# Patient Record
Sex: MALE | Race: WHITE | NOT HISPANIC OR LATINO | Employment: OTHER | URBAN - METROPOLITAN AREA
[De-identification: names, ages, dates, MRNs, and addresses within clinical notes are randomized per-mention and may not be internally consistent; named-entity substitution may affect disease eponyms.]

---

## 2022-06-02 PROBLEM — R80.1 PERSISTENT PROTEINURIA: Status: ACTIVE | Noted: 2022-06-02

## 2022-06-02 PROBLEM — I10 ESSENTIAL HYPERTENSION: Status: ACTIVE | Noted: 2022-06-02

## 2022-06-02 PROBLEM — I48.0 PAROXYSMAL ATRIAL FIBRILLATION: Status: ACTIVE | Noted: 2022-06-02

## 2022-06-02 PROBLEM — N18.31 STAGE 3A CHRONIC KIDNEY DISEASE: Status: ACTIVE | Noted: 2022-06-02

## 2022-06-02 PROBLEM — N25.81 SECONDARY HYPERPARATHYROIDISM OF RENAL ORIGIN: Status: ACTIVE | Noted: 2022-06-02

## 2022-06-02 RX ORDER — CARVEDILOL 25 MG/1
25 TABLET ORAL 2 TIMES DAILY WITH MEALS
COMMUNITY

## 2022-06-02 RX ORDER — MEMANTINE HYDROCHLORIDE 10 MG/1
5 TABLET ORAL 2 TIMES DAILY
COMMUNITY

## 2022-06-02 RX ORDER — WARFARIN SODIUM 5 MG/1
5 TABLET ORAL DAILY
COMMUNITY

## 2022-06-02 RX ORDER — DONEPEZIL HYDROCHLORIDE 10 MG/1
10 TABLET, FILM COATED ORAL NIGHTLY
COMMUNITY

## 2022-06-02 RX ORDER — PANTOPRAZOLE SODIUM 40 MG/1
40 TABLET, DELAYED RELEASE ORAL DAILY
COMMUNITY

## 2022-06-02 RX ORDER — LEVOTHYROXINE SODIUM 50 UG/1
50 TABLET ORAL
COMMUNITY

## 2022-06-02 NOTE — PROGRESS NOTES
Pt Name:  Charlie Snider  Pt :  1940  Pt MRN:  [id number]    Date: 6/3/2022    Reason for visit:   Charlie Snider is a 81 y.o. c male presenting for CKD follow up with serum creatinine 1.40, eGFR 46 and Chronic Kidney Disease Stage 3a.     Chief Complaint:   The patient denies any complaints today.    HPI:  The patient is being seen today for follow up CKD and the status of his kidney function. Since the last visit, patient reports no health changes.  The patient denies fatigue, weakness, poor appetite, metallic taste, nausea, cramping, chest pain, palpitations, orthopnea, PND, edema, trouble concentrating, decreased urination.  He c/o SOB with exertion.     He does not routinely monitor his home Bps.  The patient is following a low sodium diet. The patient is avoiding NSAIDs. The patient is drinking 32 - 48 oz of water daily.     Since last visit on 12/3/21 patient reports no changes in medical history.      History:   Past Medical History:   Diagnosis Date    Anemia, unspecified     Arrhythmia     Disorder of kidney and ureter     Diverticulosis     Gastric polyp     Gastritis     HTN (hypertension)     Hypothyroidism, unspecified     Internal hemorrhoids     Irritable bowel syndrome without diarrhea     Paroxysmal atrial fibrillation     Unspecified dementia without behavioral disturbance      Past Surgical History:   Procedure Laterality Date    AICD insertion      COLONOSCOPY      FINGER SURGERY      UPPER GASTROINTESTINAL ENDOSCOPY       Family History   Problem Relation Age of Onset    Diabetes Mother     Hypertension Mother     Heart disease Mother     Cancer Sister     Heart disease Brother      Social History     Substance and Sexual Activity   Alcohol Use Never     Social History     Substance and Sexual Activity   Drug Use Never     Social History     Substance and Sexual Activity   Sexual Activity Not on file     has no history on file for sexual  "activity.  Social History     Tobacco Use   Smoking Status Former Smoker   Smokeless Tobacco Former User       Allergies:  Review of patient's allergies indicates:   Allergen Reactions    Diovan [valsartan]          Current Outpatient Medications:     carvediloL (COREG) 25 MG tablet, Take 25 mg by mouth 2 (two) times daily with meals., Disp: , Rfl:     donepeziL (ARICEPT) 10 MG tablet, Take 10 mg by mouth every evening., Disp: , Rfl:     levothyroxine (SYNTHROID) 50 MCG tablet, Take 50 mcg by mouth before breakfast., Disp: , Rfl:     lisinopriL 10 MG tablet, Take 20 mg by mouth once daily at 6am., Disp: , Rfl:     memantine (NAMENDA) 10 MG Tab, Take 5 mg by mouth 2 (two) times daily. Pt takes 5 mg in am, and 10 mg at pm, then next week will go to 10 mg BID, Disp: , Rfl:     pantoprazole (PROTONIX) 40 MG tablet, Take 40 mg by mouth once daily., Disp: , Rfl:     warfarin (COUMADIN) 5 MG tablet, Take 5 mg by mouth once daily., Disp: , Rfl:     ROS:  Review of Systems   Constitutional: Negative for activity change, appetite change and fatigue.   Eyes: Negative for visual disturbance.   Respiratory: Positive for shortness of breath.    Cardiovascular: Negative for chest pain, palpitations and leg swelling.   Gastrointestinal: Negative for abdominal pain, constipation, nausea and vomiting.   Genitourinary: Negative for decreased urine volume, difficulty urinating, dysuria, flank pain, frequency and urgency.   Musculoskeletal: Negative for back pain and joint swelling.   Skin: Negative for color change and rash.   Neurological: Negative for dizziness, weakness, light-headedness and headaches.       Physical Exam:   Vitals:   Vitals:    06/03/22 0955   BP: 121/80   Pulse: 76   Temp: 96.1 °F (35.6 °C)   SpO2: 99%   Weight: 73 kg (161 lb)   Height: 5' 10" (1.778 m)   PainSc: 0-No pain     Body mass index is 23.1 kg/m².    Physical Exam  Vitals reviewed.   Constitutional:       General: He is awake. He is not in acute " distress.  HENT:      Head: Normocephalic.      Mouth/Throat:      Mouth: Mucous membranes are moist.   Eyes:      General: No scleral icterus.     Conjunctiva/sclera: Conjunctivae normal.      Pupils: Pupils are equal, round, and reactive to light.   Cardiovascular:      Rate and Rhythm: Normal rate and regular rhythm.      Heart sounds: Normal heart sounds, S1 normal and S2 normal. No murmur heard.     Comments: Bilateral pedal edema trace   Pulmonary:      Effort: Pulmonary effort is normal.      Breath sounds: Normal breath sounds. No wheezing, rhonchi or rales.   Abdominal:      General: There is no distension.      Palpations: Abdomen is soft. There is no mass.   Musculoskeletal:         General: No swelling.      Right lower leg: Edema present.      Left lower leg: Edema present.   Skin:     General: Skin is warm and dry.      Nails: There is no clubbing.   Neurological:      General: No focal deficit present.      Mental Status: He is alert and oriented to person, place, and time.   Psychiatric:         Mood and Affect: Mood and affect normal.         Thought Content: Thought content normal.         Labs/Tests:  Component Ref Range & Units 2 d ago   (5/31/22) 6 mo ago   (12/2/21) 6 mo ago   (12/2/21) 1 yr ago   (5/25/21) 1 yr ago   (5/25/21) 1 yr ago   (11/23/20) 1 yr ago   (11/23/20)   Sodium 136 - 147 mmol/L 140  139    140  139     Potassium 3.5 - 5.1 mmol/L 4.0  4.2    4.5  4.4     Chloride 98 - 107 mmol/L 107  107    107  105     CO2 20 - 31 mmol/L 29  29    27  29     Glucose 70 - 99 mg/dL 90  125 High   Negative R  Negative R  92  88  Negative R    BUN 9 - 23 mg/dL 24 High   26 High     18  31 High      Creatinine 0.70 - 1.30 mg/dL 1.40 High   1.46 High     1.33 High   1.61 High      Calcium 8.3 - 10.6 mg/dL 8.9  9.2    9.4  9.4     Phosphorus Level 2.4 - 5.1 mg/dL 3.4  3.0    3.1  3.4     Albumin Level 3.2 - 4.8 g/dL 4.1  4.1    4.4  4.3     eGFR >90 mL/min/1.73m2 46 Low   44 Low     50 Low   39 Low       eGFR African American >90 mL/min/1.73m2 53 Low   51 Low  CM           Component Ref Range & Units 2 d ago   (5/31/22) 6 mo ago   (12/2/21) 1 yr ago   (5/25/21) 1 yr ago   (11/23/20) 2 yr ago   (5/20/20)   Protein, Urine mg/dL mg/dL 24  32  16  21  9    Creatinine, Urine mg/dL mg/dL 238.6  351.7  179.8  211.6  132.3    Urine Protein/Creatinine Ratio <0.2 mg of protein/mg of creatinine 0.1  0.1  0.1        Component Ref Range & Units 2 d ago 6 mo ago 1 yr ago 2 yr ago   PTH, Intact 15 - 65 pg/mL 69 High   54  45  40            Diagnosis:  1. Essential hypertension     2. Paroxysmal atrial fibrillation     3. Stage 3a chronic kidney disease  Renal Function Panel    Protein/Creatinine Ratio, Urine    Hemoglobin    Renal Function Panel    Protein/Creatinine Ratio, Urine    Hemoglobin   4. Secondary hyperparathyroidism of renal origin  Vitamin D    Renal Function Panel    PTH, Intact    Vitamin D    Renal Function Panel    PTH, Intact   5. Persistent proteinuria  Protein/Creatinine Ratio, Urine    Protein/Creatinine Ratio, Urine         Plan:  1. Essential hypertension - at goal -  Monitor BP, 2 Gram NA diet,  Continue Coreg 25 mg po BID; Continue Lisinopril 20 mg po daily -    2. Paroxysmal atrial fibrillation - Managed by Dr. Conley    3. Stage 3a chronic kidney disease - serum creatinine 1.40 / eGFR 46, Avoid NSAIDS, Assure 64 oz of water daily, Meds Per # 1    4. Secondary hyperparathyroidism of renal origin - PTH 69 - No indication for specialized vitamin D at this time    5. Persistent proteinuria - 100 mg - ACE         My reconciliation of medication should not condone or support use of medications that have been previously prescribed for patients by other providers.  I am only documenting the current medications patients is taking and may change doses, add or discontinue medications based on information provided by the patient.     The patient has been provided with their current level of kidney function  including eGFR and creatinine.    We discussed the potential for common complications of CKD including anemia, electrolyte abnormalities, abnormal fluid balance, mineral bone disease and malnutrition.    We discussed strategies to slow the progression of their kidney disease includin. Avoid nephrotoxic agents. Avoid over-the-counter and prescription NSAIDs (Ibuprofren, Motrin, Naproxyn, Aleve, Mobic, Celebrex, Toradol, Advil). All of these can worsen kidney function, elevate BP, cause fluid retention/swelling and elevate potassium. Avoid iodine contrast agents and gadolinium, which can worsen kidney function and/or cause kidney failure. Avoid phosphosoda bowel preps which can worsen kidney function.  2. Work to improve modifiable risk factors. Aim for good control of blood glucose without episodes of hypoglycemia. Notify the provider managing your diabetes if your blood glucose < 60. Aim for good blood pressure control without episodes of hypotension. Call the office if your systolic blood pressure is consistently < 110. Aim for good control of your cholesterol.  ? AIC goal <7.0  ? BP goal <130/80        Keeping these in goal range will help prevent progression of cardiovascular disease            and chronic kidney disease.    We discussed dietary modifications:  ? Low sodium diet: 2 gm/d or less  ? Limit/avoid high potassium foods  ? Avoid potassium containing salt substitutes  ? Limit/avoid high phosphorus foods  ? Limit daily protein intake to 0.8-1 gm/kg of your ideal body weight.    We discussed lifestyle modifications:  ? Make sure you are drinking plenty of fluids--64 ounces (1/2 gallon) daily  ? Exercise at least 30 minutes 5 x per week (total 150 minutes per week), example brisk walking  ? Achieve and maintain a healthy weight (BMI 20-25)  ? Limit alcohol consumption to <2 drinks per day  ? Stop smoking  ? Make sure you stay current on important vaccines-- pneumonia vaccines (Pneumovax and Prevnar),  flu vaccine, Hepatitis B (especially patients nearing renal replacement therapy and planning hemodialysis) and Covid-19 vaccine.     Recommendations:  1. Monitor your BP at home daily and record.  2. Bring readings to your next appt.  3. Call the office if your BP is persistently >130/80.  4. Seek urgent medical attention with signs and symptoms of uremia - extreme weakness, fatigue, confusion, anorexia, metallic taste in mouth, hiccoughs, cramping, itching, chest pain, swelling, or trouble sleeping.    Follow Up:   Follow up in about 6 months (around 12/3/2022).

## 2022-06-03 ENCOUNTER — OFFICE VISIT (OUTPATIENT)
Dept: NEPHROLOGY | Facility: CLINIC | Age: 82
End: 2022-06-03
Payer: MEDICARE

## 2022-06-03 VITALS
WEIGHT: 161 LBS | SYSTOLIC BLOOD PRESSURE: 121 MMHG | DIASTOLIC BLOOD PRESSURE: 80 MMHG | HEART RATE: 76 BPM | OXYGEN SATURATION: 99 % | TEMPERATURE: 96 F | BODY MASS INDEX: 23.05 KG/M2 | HEIGHT: 70 IN

## 2022-06-03 DIAGNOSIS — I10 ESSENTIAL HYPERTENSION: Primary | ICD-10-CM

## 2022-06-03 DIAGNOSIS — R80.1 PERSISTENT PROTEINURIA: ICD-10-CM

## 2022-06-03 DIAGNOSIS — I48.0 PAROXYSMAL ATRIAL FIBRILLATION: ICD-10-CM

## 2022-06-03 DIAGNOSIS — N18.31 STAGE 3A CHRONIC KIDNEY DISEASE: ICD-10-CM

## 2022-06-03 DIAGNOSIS — N25.81 SECONDARY HYPERPARATHYROIDISM OF RENAL ORIGIN: ICD-10-CM

## 2022-06-03 PROCEDURE — 99213 OFFICE O/P EST LOW 20 MIN: CPT | Mod: ,,, | Performed by: NURSE PRACTITIONER

## 2022-06-03 PROCEDURE — 99213 PR OFFICE/OUTPT VISIT, EST, LEVL III, 20-29 MIN: ICD-10-PCS | Mod: ,,, | Performed by: NURSE PRACTITIONER

## 2022-06-03 RX ORDER — LISINOPRIL 10 MG/1
20 TABLET ORAL DAILY
COMMUNITY